# Patient Record
Sex: MALE | Race: WHITE | NOT HISPANIC OR LATINO | Employment: FULL TIME | URBAN - METROPOLITAN AREA
[De-identification: names, ages, dates, MRNs, and addresses within clinical notes are randomized per-mention and may not be internally consistent; named-entity substitution may affect disease eponyms.]

---

## 2022-04-12 ENCOUNTER — HOSPITAL ENCOUNTER (EMERGENCY)
Facility: HOSPITAL | Age: 32
Discharge: HOME | End: 2022-04-12
Attending: EMERGENCY MEDICINE

## 2022-04-12 VITALS
RESPIRATION RATE: 16 BRPM | TEMPERATURE: 98 F | DIASTOLIC BLOOD PRESSURE: 76 MMHG | HEART RATE: 89 BPM | HEIGHT: 72 IN | BODY MASS INDEX: 32.64 KG/M2 | SYSTOLIC BLOOD PRESSURE: 151 MMHG | WEIGHT: 241 LBS | OXYGEN SATURATION: 100 %

## 2022-04-12 DIAGNOSIS — R10.30 LOWER ABDOMINAL PAIN: Primary | ICD-10-CM

## 2022-04-12 DIAGNOSIS — K62.5 RECTAL BLEEDING: ICD-10-CM

## 2022-04-12 LAB
ALBUMIN SERPL BCP-MCNC: 3.5 G/DL (ref 3.5–5.2)
ALP SERPL-CCNC: 81 U/L (ref 55–135)
ALT SERPL W/O P-5'-P-CCNC: 20 U/L (ref 10–44)
ANION GAP SERPL CALC-SCNC: 8 MMOL/L (ref 8–16)
AST SERPL-CCNC: 18 U/L (ref 10–40)
BASOPHILS # BLD AUTO: 0.04 K/UL (ref 0–0.2)
BASOPHILS NFR BLD: 0.4 % (ref 0–1.9)
BILIRUB SERPL-MCNC: 0.5 MG/DL (ref 0.1–1)
BUN SERPL-MCNC: 8 MG/DL (ref 6–20)
CALCIUM SERPL-MCNC: 8.6 MG/DL (ref 8.7–10.5)
CHLORIDE SERPL-SCNC: 100 MMOL/L (ref 95–110)
CO2 SERPL-SCNC: 27 MMOL/L (ref 23–29)
CREAT SERPL-MCNC: 0.8 MG/DL (ref 0.5–1.4)
CREAT SERPL-MCNC: 0.8 MG/DL (ref 0.5–1.4)
DIFFERENTIAL METHOD: ABNORMAL
EOSINOPHIL # BLD AUTO: 0.1 K/UL (ref 0–0.5)
EOSINOPHIL NFR BLD: 0.7 % (ref 0–8)
ERYTHROCYTE [DISTWIDTH] IN BLOOD BY AUTOMATED COUNT: 13.5 % (ref 11.5–14.5)
EST. GFR  (AFRICAN AMERICAN): >60 ML/MIN/1.73 M^2
EST. GFR  (NON AFRICAN AMERICAN): >60 ML/MIN/1.73 M^2
GLUCOSE SERPL-MCNC: 91 MG/DL (ref 70–110)
HCT VFR BLD AUTO: 49.8 % (ref 40–54)
HGB BLD-MCNC: 15.9 G/DL (ref 14–18)
IMM GRANULOCYTES # BLD AUTO: 0.04 K/UL (ref 0–0.04)
IMM GRANULOCYTES NFR BLD AUTO: 0.4 % (ref 0–0.5)
LYMPHOCYTES # BLD AUTO: 1.7 K/UL (ref 1–4.8)
LYMPHOCYTES NFR BLD: 17.6 % (ref 18–48)
MCH RBC QN AUTO: 28 PG (ref 27–31)
MCHC RBC AUTO-ENTMCNC: 31.9 G/DL (ref 32–36)
MCV RBC AUTO: 88 FL (ref 82–98)
MONOCYTES # BLD AUTO: 1.1 K/UL (ref 0.3–1)
MONOCYTES NFR BLD: 11.7 % (ref 4–15)
NEUTROPHILS # BLD AUTO: 6.5 K/UL (ref 1.8–7.7)
NEUTROPHILS NFR BLD: 69.2 % (ref 38–73)
NRBC BLD-RTO: 0 /100 WBC
PLATELET # BLD AUTO: 356 K/UL (ref 150–450)
PMV BLD AUTO: 9.7 FL (ref 9.2–12.9)
POTASSIUM SERPL-SCNC: 4.2 MMOL/L (ref 3.5–5.1)
PROT SERPL-MCNC: 7.5 G/DL (ref 6–8.4)
RBC # BLD AUTO: 5.67 M/UL (ref 4.6–6.2)
SAMPLE: NORMAL
SODIUM SERPL-SCNC: 135 MMOL/L (ref 136–145)
WBC # BLD AUTO: 9.38 K/UL (ref 3.9–12.7)

## 2022-04-12 PROCEDURE — 99285 EMERGENCY DEPT VISIT HI MDM: CPT | Mod: 25

## 2022-04-12 PROCEDURE — 25000003 PHARM REV CODE 250: Performed by: EMERGENCY MEDICINE

## 2022-04-12 PROCEDURE — 63600175 PHARM REV CODE 636 W HCPCS: Performed by: EMERGENCY MEDICINE

## 2022-04-12 PROCEDURE — 96375 TX/PRO/DX INJ NEW DRUG ADDON: CPT

## 2022-04-12 PROCEDURE — 80053 COMPREHEN METABOLIC PANEL: CPT | Performed by: EMERGENCY MEDICINE

## 2022-04-12 PROCEDURE — 85025 COMPLETE CBC W/AUTO DIFF WBC: CPT | Performed by: EMERGENCY MEDICINE

## 2022-04-12 PROCEDURE — 96374 THER/PROPH/DIAG INJ IV PUSH: CPT

## 2022-04-12 PROCEDURE — 25500020 PHARM REV CODE 255: Performed by: EMERGENCY MEDICINE

## 2022-04-12 RX ORDER — HYDROCORTISONE ACETATE PRAMOXINE HCL 1; 1 G/100G; G/100G
CREAM TOPICAL 3 TIMES DAILY
Qty: 30 G | Refills: 0 | Status: SHIPPED | OUTPATIENT
Start: 2022-04-12

## 2022-04-12 RX ORDER — ONDANSETRON 2 MG/ML
4 INJECTION INTRAMUSCULAR; INTRAVENOUS
Status: COMPLETED | OUTPATIENT
Start: 2022-04-12 | End: 2022-04-12

## 2022-04-12 RX ORDER — MORPHINE SULFATE 4 MG/ML
4 INJECTION, SOLUTION INTRAMUSCULAR; INTRAVENOUS
Status: COMPLETED | OUTPATIENT
Start: 2022-04-12 | End: 2022-04-12

## 2022-04-12 RX ORDER — PREDNISONE 20 MG/1
40 TABLET ORAL DAILY
Qty: 10 TABLET | Refills: 0 | Status: SHIPPED | OUTPATIENT
Start: 2022-04-12 | End: 2022-04-17

## 2022-04-12 RX ORDER — SODIUM CHLORIDE 9 MG/ML
1000 INJECTION, SOLUTION INTRAVENOUS
Status: COMPLETED | OUTPATIENT
Start: 2022-04-12 | End: 2022-04-12

## 2022-04-12 RX ORDER — CIPROFLOXACIN 500 MG/1
500 TABLET ORAL 2 TIMES DAILY
Qty: 14 TABLET | Refills: 0 | Status: SHIPPED | OUTPATIENT
Start: 2022-04-12 | End: 2022-04-19

## 2022-04-12 RX ORDER — METRONIDAZOLE 500 MG/1
500 TABLET ORAL 3 TIMES DAILY
Qty: 21 TABLET | Refills: 0 | Status: SHIPPED | OUTPATIENT
Start: 2022-04-12 | End: 2022-04-19

## 2022-04-12 RX ADMIN — IOHEXOL 100 ML: 350 INJECTION, SOLUTION INTRAVENOUS at 10:04

## 2022-04-12 RX ADMIN — MORPHINE SULFATE 4 MG: 4 INJECTION, SOLUTION INTRAMUSCULAR; INTRAVENOUS at 10:04

## 2022-04-12 RX ADMIN — SODIUM CHLORIDE 1000 ML: 0.9 INJECTION, SOLUTION INTRAVENOUS at 10:04

## 2022-04-12 RX ADMIN — ONDANSETRON 4 MG: 2 INJECTION INTRAMUSCULAR; INTRAVENOUS at 10:04

## 2022-04-12 NOTE — Clinical Note
"Rolando"Chioma Campuzano was seen and treated in our emergency department on 4/12/2022.  He may return to work on 04/15/2022.       If you have any questions or concerns, please don't hesitate to call.      Esequiel Matthews MD"

## 2022-04-12 NOTE — ED PROVIDER NOTES
Encounter Date: 4/12/2022       History     Chief Complaint   Patient presents with    Abdominal Pain    Rectal Bleeding     Patient reports RLQ abd pain and rectal bleeding x 2 weeks      Patient has a history of ulcerative colitis.  He has been having intermittent right lower quadrant pain that is been ongoing for the last 2 weeks with intermittent current jelly stools.  He also complains of hemorrhoids.  He denies any griselda GI bleeding filling up the toilet bowl full of blood however he does see streaks of blood with bowel movement.  He has not seen a GI physician recently.  He denies fever or chills.        Review of patient's allergies indicates:  No Known Allergies  No past medical history on file.  No past surgical history on file.  No family history on file.     Review of Systems   All other systems reviewed and are negative.      Physical Exam     Initial Vitals [04/12/22 0859]   BP Pulse Resp Temp SpO2   (!) 177/132 (!) 113 20 98 °F (36.7 °C) 100 %      MAP       --         Physical Exam    Nursing note and vitals reviewed.  Constitutional: He appears well-developed and well-nourished. He is not diaphoretic. No distress.   Pleasant, polite.   HENT:   Head: Normocephalic and atraumatic.   Eyes: EOM are normal.   Neck: Neck supple.   Normal range of motion.  Cardiovascular: Normal rate, regular rhythm, normal heart sounds and intact distal pulses.   Pulmonary/Chest: Breath sounds normal. No respiratory distress.   Abdominal: He exhibits no distension. There is abdominal tenderness.   Genitourinary:    Genitourinary Comments: There are external hemorrhoids without thrombosis no active bleeding     Musculoskeletal:         General: Normal range of motion.      Cervical back: Normal range of motion and neck supple.     Neurological: He is alert.   Skin: Skin is warm and dry.   Psychiatric: He has a normal mood and affect. His behavior is normal. Judgment and thought content normal.         ED Course    Procedures  Labs Reviewed   CBC W/ AUTO DIFFERENTIAL - Abnormal; Notable for the following components:       Result Value    MCHC 31.9 (*)     Mono # 1.1 (*)     Lymph % 17.6 (*)     All other components within normal limits   COMPREHENSIVE METABOLIC PANEL - Abnormal; Notable for the following components:    Sodium 135 (*)     Calcium 8.6 (*)     All other components within normal limits   ISTAT CREATININE          Imaging Results          CT Abdomen Pelvis With Contrast (Final result)  Result time 04/12/22 10:44:34    Final result by Eleanor Cheek MD (04/12/22 10:44:34)                 Narrative:    All CT scans at this facility used dose modulation, iterative reconstruction and/or weight-based dosing when appropriate to reduce radiation doses  as low as reasonably achievable.    HISTORY: GI bleed; Abdominal abscess/infection suspected    FINDINGS: Axial postcontrast imaging was performed with 100 mL Omnipaque 350 IV contrast .    CT ABDOMEN: The lung bases are clear.    The liver, spleen and pancreas demonstrate normal enhancement. There is no biliary duct dilatation. The gallbladder and adrenal glands are normal.    The kidneys enhance symmetrically without hydronephrosis or calculi.    There are no thick-walled or dilated bowel loops. There is no mesenteric or retroperitoneal adenopathy. The aorta is normal in caliber. The musculature is normal.    CT PELVIS: The bladder and prostate gland are normal. There is no pelvic adenopathy. There are no acute osseous abnormalities. There are mild degenerative changes of the lower thoracic spine.    IMPRESSION: No acute abdominal or pelvic process    Electronically signed by:  Eleanor Cheek MD  4/12/2022 10:44 AM CDT Workstation: [id number]                               Medications   0.9%  NaCl infusion (1,000 mLs Intravenous New Bag 4/12/22 1001)   morphine injection 4 mg (4 mg Intravenous Given 4/12/22 1002)   ondansetron injection 4 mg (4 mg Intravenous  Given 4/12/22 1002)   iohexoL (OMNIPAQUE 350) injection 100 mL (100 mLs Intravenous Given 4/12/22 1017)     Medical Decision Making:   Initial Assessment:   No apparent distress  Differential Diagnosis:   Considerations include colitis, diverticulitis, appendicitis, obstruction  Clinical Tests:   Lab Tests: Reviewed and Ordered  Radiological Study: Ordered and Reviewed  Medical Tests: Reviewed and Ordered  ED Management:  There is no CT evidence of any acute surgical problem.  Patient's blood work is within normal limits.  He is having no active GI bleeding at this time.  I do think likely patient having some ulcerative colitis flare although there is no CT evidence of this.  Patient given prescription for antibiotics as well as prednisone and was given resource for follow-up with GI physician.  Patient be discharged stable condition.  Detailed return precautions discussed.                      Clinical Impression:   Final diagnoses:  [R10.30] Lower abdominal pain (Primary)  [K62.5] Rectal bleeding          ED Disposition Condition    Discharge         ED Prescriptions     Medication Sig Dispense Start Date End Date Auth. Provider    metroNIDAZOLE (FLAGYL) 500 MG tablet Take 1 tablet (500 mg total) by mouth 3 (three) times daily. for 7 days 21 tablet 4/12/2022 4/19/2022 Esequiel Matthews MD    ciprofloxacin HCl (CIPRO) 500 MG tablet Take 1 tablet (500 mg total) by mouth 2 (two) times daily. for 7 days 14 tablet 4/12/2022 4/19/2022 Esequiel Matthews MD    predniSONE (DELTASONE) 20 MG tablet Take 2 tablets (40 mg total) by mouth once daily. for 5 days 10 tablet 4/12/2022 4/17/2022 Esequiel Matthews MD    pramoxine-hydrocortisone (PROCTOCREAM-HC) 1-1 % rectal cream Place rectally 3 (three) times daily. 30 g 4/12/2022  Esequiel Matthews MD        Follow-up Information     Follow up With Specialties Details Why Contact Info    Abe Joy MD Gastroenterology In 1 week  [id number] [place] [id number]  [phone]              Esequiel Matthews MD  04/12/22 8217